# Patient Record
Sex: FEMALE | Race: WHITE | Employment: UNEMPLOYED | URBAN - METROPOLITAN AREA
[De-identification: names, ages, dates, MRNs, and addresses within clinical notes are randomized per-mention and may not be internally consistent; named-entity substitution may affect disease eponyms.]

---

## 2018-02-05 ENCOUNTER — OFFICE VISIT (OUTPATIENT)
Dept: OBGYN CLINIC | Age: 78
End: 2018-02-05
Payer: COMMERCIAL

## 2018-02-05 ENCOUNTER — TELEPHONE (OUTPATIENT)
Dept: OBGYN CLINIC | Age: 78
End: 2018-02-05

## 2018-02-05 VITALS
SYSTOLIC BLOOD PRESSURE: 136 MMHG | HEIGHT: 61 IN | DIASTOLIC BLOOD PRESSURE: 86 MMHG | WEIGHT: 159 LBS | BODY MASS INDEX: 30.02 KG/M2

## 2018-02-05 DIAGNOSIS — N95.0 POSTMENOPAUSAL BLEEDING: Primary | ICD-10-CM

## 2018-02-05 DIAGNOSIS — R93.89 THICKENED ENDOMETRIUM: ICD-10-CM

## 2018-02-05 DIAGNOSIS — R18.8 PELVIC ASCITES: ICD-10-CM

## 2018-02-05 PROCEDURE — 99203 OFFICE O/P NEW LOW 30 MIN: CPT | Performed by: OBSTETRICS & GYNECOLOGY

## 2018-02-05 PROCEDURE — 58100 BIOPSY OF UTERUS LINING: CPT | Performed by: OBSTETRICS & GYNECOLOGY

## 2018-02-05 RX ORDER — CYCLOBENZAPRINE HCL 10 MG
10 TABLET ORAL
COMMUNITY
Start: 2017-12-01

## 2018-02-05 RX ORDER — ALENDRONATE SODIUM 70 MG/1
TABLET ORAL
COMMUNITY
Start: 2017-10-07

## 2018-02-05 RX ORDER — SIMVASTATIN 20 MG
TABLET ORAL
COMMUNITY

## 2018-02-05 RX ORDER — LOSARTAN POTASSIUM 100 MG/1
100 TABLET ORAL
COMMUNITY
Start: 2017-09-24

## 2018-02-05 RX ORDER — IBUPROFEN 400 MG/1
400 TABLET ORAL
COMMUNITY

## 2018-02-05 RX ORDER — ASPIRIN 81 MG/1
TABLET, CHEWABLE ORAL
COMMUNITY

## 2018-02-05 RX ORDER — KETOCONAZOLE 20 MG/ML
SHAMPOO TOPICAL
COMMUNITY
Start: 2017-10-12

## 2018-02-05 RX ORDER — CARVEDILOL 12.5 MG/1
12.5 TABLET ORAL
COMMUNITY
Start: 2017-10-14

## 2018-02-05 RX ORDER — NAPROXEN 375 MG/1
TABLET ORAL
COMMUNITY
Start: 2017-10-12

## 2018-02-05 ASSESSMENT — ENCOUNTER SYMPTOMS
DIARRHEA: 0
HEARTBURN: 0
BLURRED VISION: 0
VOMITING: 0
CONSTIPATION: 0
COUGH: 0
NAUSEA: 0
WHEEZING: 0
DOUBLE VISION: 0
ABDOMINAL PAIN: 0
ORTHOPNEA: 0

## 2018-02-05 NOTE — PROGRESS NOTES
[facility]  [facility] Judy Acuña  DATE OF VISIT:  18    Red Hernandez    :  1940  CHIEF COMPLAINT:    Chief Complaint   Patient presents with    New Patient     Possible EMB today        HPI :   Red Hernandez is a 65 y.o. female here for postmenopausal bleeding. This has been going on since December and is usually spotting a few days a week. Occasionally it is bright red. She denies any abdominal bloating, constipation, early satiety, increased abdominal girth over this time frame. She went through menopause in  and has had no bleeding since then. She was seen last week for this concern at her primary care physician's office and an ultrasound obtained. I was able to view the images in [employer]'s epic. The TVUS shows a 6.8 x 3.1 x 4.3 cm uterus with a thickened endometrium measuring 1.6 cm. The right ovary was not visualized due to overlying bowel, but the left ovary measured 1.5x1x1.9 cm. A small amount of ascites was seen in the lower abdomen and pelvis, described as complex.      Past Medical History:   Diagnosis Date    Aortic regurgitation     Hyperlipidemia     Hypertension                                                                    Past Surgical History:   Procedure Laterality Date    AORTIC VALVE REPLACEMENT       Family History   Problem Relation Age of Onset    Uterine Cancer Mother     Other Mother      pancreatic cancer    Esophageal Cancer Father     Stomach Cancer Other      History   Smoking Status    Never Smoker   Smokeless Tobacco    Never Used     History   Alcohol Use No     Current Outpatient Prescriptions   Medication Sig Dispense Refill    alendronate (FOSAMAX) 70 MG tablet       aspirin 81 MG chewable tablet       carvedilol (COREG) 12.5 MG tablet Take 12.5 mg by mouth      cyclobenzaprine (FLEXERIL) 10 MG tablet Take 10 mg by mouth      ibuprofen (ADVIL;MOTRIN) 400 MG tablet Take 400 mg by mouth      ketoconazole (NIZORAL) 2 % shampoo       injury on the left labia. Uterus is not deviated, not enlarged, not fixed and not tender. Cervix exhibits no motion tenderness, no discharge and no friability. Right adnexum displays no mass, no tenderness and no fullness. Left adnexum displays no mass, no tenderness and no fullness. No erythema, tenderness or bleeding in the vagina. No foreign body in the vagina. No signs of injury around the vagina. No vaginal discharge found. Musculoskeletal: Normal range of motion. Neurological: She is alert and oriented to person, place, and time. Skin: Skin is warm and dry. Psychiatric: She has a normal mood and affect. Her behavior is normal. Judgment and thought content normal.       PROCEDURE:  The patient was consented on the need for endometrial biopsy for thickened endometrium on USN. She understands the risks of the procedure are bleeding, pain, and uterine perforation. She elects to proceed. She was placed on the exam table in the dorsal lithotomy position with her feet in the stirrups. The speculum was placed and the cervical os visualized. A single tooth tenaculum was used to grasp the cervix. The endometrial biopsy pipette was passed and the uterus sounded to 6cm. Suction was created and a sample was obtained. The pipette was withdrawn and the specimen labeled and sent to pathology. The patient tolerated the procedure well and was counseled to take Motrin for pain prn at home. She was given a pre-procedure dose of Motrin in the office as well. She was instructed to call with heavy bleeding, severe pain, or fever. ASSESSMENT:  Hai Carrington is a 65 y.o. female      ICD-10-CM ICD-9-CM    1. Postmenopausal bleeding N95.0 627.1 64596 - MO BIOPSY OF UTERUS LINING   2.  Thickened endometrium R93.8 793.5 28595 - MO BIOPSY OF UTERUS LINING   3. Pelvic ascites R18.8 789.59 CT ABDOMEN PELVIS W WO CONTRAST Additional Contrast? Oral (IV)       PLAN:    We discussed her ultrasound findings of thickened endometrium

## 2018-02-05 NOTE — TELEPHONE ENCOUNTER
Patient calling to ask for ct order of abd and pelvis be faxed to [facility], [place] scheduling and this was done to [phone] with receipt confirmed

## 2018-02-06 DIAGNOSIS — R18.8 PELVIC ASCITES: Primary | ICD-10-CM

## 2018-02-07 DIAGNOSIS — R18.8 OTHER ASCITES: Primary | ICD-10-CM

## 2018-02-07 DIAGNOSIS — R93.89 THICKENED ENDOMETRIUM: ICD-10-CM

## 2018-02-07 DIAGNOSIS — N95.0 POST-MENOPAUSAL BLEEDING: ICD-10-CM

## 2019-01-08 ENCOUNTER — TELEPHONE (OUTPATIENT)
Dept: OBGYN CLINIC | Age: 79
End: 2019-01-08